# Patient Record
Sex: MALE | Race: WHITE | Employment: OTHER | URBAN - METROPOLITAN AREA
[De-identification: names, ages, dates, MRNs, and addresses within clinical notes are randomized per-mention and may not be internally consistent; named-entity substitution may affect disease eponyms.]

---

## 2021-04-21 ENCOUNTER — APPOINTMENT (OUTPATIENT)
Dept: CT IMAGING | Age: 81
End: 2021-04-21
Attending: EMERGENCY MEDICINE
Payer: MEDICARE

## 2021-04-21 ENCOUNTER — HOSPITAL ENCOUNTER (EMERGENCY)
Age: 81
Discharge: HOME | End: 2021-04-21
Attending: EMERGENCY MEDICINE
Payer: MEDICARE

## 2021-04-21 ENCOUNTER — APPOINTMENT (OUTPATIENT)
Dept: GENERAL RADIOLOGY | Age: 81
End: 2021-04-21
Attending: EMERGENCY MEDICINE
Payer: MEDICARE

## 2021-04-21 VITALS
SYSTOLIC BLOOD PRESSURE: 119 MMHG | RESPIRATION RATE: 16 BRPM | TEMPERATURE: 98.2 F | WEIGHT: 140 LBS | OXYGEN SATURATION: 98 % | DIASTOLIC BLOOD PRESSURE: 71 MMHG | HEIGHT: 71 IN | HEART RATE: 72 BPM | BODY MASS INDEX: 19.6 KG/M2

## 2021-04-21 DIAGNOSIS — S32.010A COMPRESSION FRACTURE OF L1 VERTEBRA, INITIAL ENCOUNTER (HCC): Primary | ICD-10-CM

## 2021-04-21 PROCEDURE — 74011000250 HC RX REV CODE- 250: Performed by: EMERGENCY MEDICINE

## 2021-04-21 PROCEDURE — 99284 EMERGENCY DEPT VISIT MOD MDM: CPT

## 2021-04-21 PROCEDURE — 74011250637 HC RX REV CODE- 250/637: Performed by: EMERGENCY MEDICINE

## 2021-04-21 PROCEDURE — 72131 CT LUMBAR SPINE W/O DYE: CPT

## 2021-04-21 PROCEDURE — 72128 CT CHEST SPINE W/O DYE: CPT

## 2021-04-21 PROCEDURE — 72170 X-RAY EXAM OF PELVIS: CPT

## 2021-04-21 RX ORDER — LIDOCAINE 4 G/100G
2 PATCH TOPICAL ONCE
Status: DISCONTINUED | OUTPATIENT
Start: 2021-04-21 | End: 2021-04-21 | Stop reason: HOSPADM

## 2021-04-21 RX ORDER — OXYCODONE HYDROCHLORIDE 5 MG/1
2.5 TABLET ORAL
Qty: 12 TAB | Refills: 0 | Status: SHIPPED | OUTPATIENT
Start: 2021-04-21 | End: 2021-04-27

## 2021-04-21 RX ORDER — ACETAMINOPHEN 325 MG/1
650 TABLET ORAL
Qty: 100 TAB | Refills: 0 | Status: SHIPPED | OUTPATIENT
Start: 2021-04-21

## 2021-04-21 RX ORDER — ACETAMINOPHEN 325 MG/1
650 TABLET ORAL
Status: COMPLETED | OUTPATIENT
Start: 2021-04-21 | End: 2021-04-21

## 2021-04-21 RX ORDER — OXYCODONE HYDROCHLORIDE 5 MG/1
5 TABLET ORAL
Status: COMPLETED | OUTPATIENT
Start: 2021-04-21 | End: 2021-04-21

## 2021-04-21 RX ADMIN — ACETAMINOPHEN 650 MG: 325 TABLET ORAL at 12:53

## 2021-04-21 RX ADMIN — OXYCODONE 5 MG: 5 TABLET ORAL at 12:53

## 2021-04-21 NOTE — ED PROVIDER NOTES
HPI   Chief Complaint   Patient presents with    Back Pain   39-year-old male with history of seizure presents with back pain. Patient reports 3 days ago he was carrying heavy loads when he lost balance and hit his back and right hip hit the wall. Patient denies falling on the ground or hitting his head, he denies any loss of consciousness. Patient reports since this injury he has had moderate intensity constant sharp mid back pain and right hip pain. Patient reports he is still able to walk without issues. Patient denies any numbness including groin numbness, he denies any urinary or fecal incontinence. Patient took Tylenol with partial relief. Past Medical History:   Diagnosis Date    Seizure [facility])        History reviewed. No pertinent surgical history. History reviewed. No pertinent family history.     Social History     Socioeconomic History    Marital status: SINGLE     Spouse name: Not on file    Number of children: Not on file    Years of education: Not on file    Highest education level: Not on file   Occupational History    Not on file   Social Needs    Financial resource strain: Not on file    Food insecurity     Worry: Not on file     Inability: Not on file    Transportation needs     Medical: Not on file     Non-medical: Not on file   Tobacco Use    Smoking status: Never Smoker   Substance and Sexual Activity    Alcohol use: Not on file    Drug use: Not on file    Sexual activity: Not on file   Lifestyle    Physical activity     Days per week: Not on file     Minutes per session: Not on file    Stress: Not on file   Relationships    Social connections     Talks on phone: Not on file     Gets together: Not on file     Attends Holiness service: Not on file     Active member of club or organization: Not on file     Attends meetings of clubs or organizations: Not on file     Relationship status: Not on file    Intimate partner violence     Fear of current or ex partner: Not on file     Emotionally abused: Not on file     Physically abused: Not on file     Forced sexual activity: Not on file   Other Topics Concern    Not on file   Social History Narrative    Not on file         ALLERGIES: Patient has no known allergies. Review of Systems   Musculoskeletal: Positive for back pain. Right hip pain   All other systems reviewed and are negative. Vitals:    04/21/21 0923 04/21/21 0950 04/21/21 1230   BP: (!) 179/81  119/71   Pulse: 74  72   Resp: 16  16   Temp: 98.2 °F (36.8 °C)     SpO2: 98% 98% 98%   Weight: 63.5 kg (140 lb)     Height: 5' 11\" (1.803 m)              Physical Exam   Patient Vitals for the past 8 hrs:   Temp Pulse Resp BP SpO2   04/21/21 1230  72 16 119/71 98 %   04/21/21 0950     98 %   04/21/21 0923 98.2 °F (36.8 °C) 74 16 (!) 179/81 98 %        Nursing note and vitals reviewed. Constitutional: NAD. Head: Normocephalic and atraumatic. Mouth/Throat: Airway patent. Moist mucous membranes. Eyes: EOMI. No Scleral icterus. Neck: Neck supple. Cardiovascular: Well perfused throughout. Pulmonary/Chest: No respiratory distress. Musculoskeletal: No gross deformities. There is thoracic midline tenderness as well as lumbar midline tenderness without step-off. There is right pelvic ring tenderness without deformity or pelvic instability. Normal gait. Bilateral feet warm and well perfused. Neurological: Alert and oriented to person, place, and time. Cranial Nerves 2-12 intact. Moving all extremities. No gross deficits  Psych: Pleasant, cooperative. Skin: Skin is warm and dry. No rash noted. MDM   Ddx = thoracic fracture, lumbar fracture, right pelvic fracture. Imaging showing L1 compression fracture. Interventional radiology Dr. Kamron Nice is consulted and saw the patient, recommend outpatient MRI and further consideration of kyphoplasty. Prescribed Tylenol, lidocaine patch and short course low-dose oxycodone.   Discharged with return precautions. Labs Reviewed - No data to display  CT SPINE [facility] WO CONT   Final Result      1. Compression fracture of L1 with approximately 15% loss of vertebral body   height. Patient may be candidate for kyphoplasty. 2. Osteopenia with mild degenerative changes in the thoracic and lumbar spine. 3. Nonobstructing renal calculi. Atherosclerotic disease. CT SPINE LUMB WO CONT   Final Result      1. Compression fracture of L1 with approximately 15% loss of vertebral body   height. Patient may be candidate for kyphoplasty. 2. Osteopenia with mild degenerative changes in the thoracic and lumbar spine. 3. Nonobstructing renal calculi. Atherosclerotic disease. XR PELV 1 OR 2 V   Final Result      IR VERTEBROPLASTY LUMBAR 1 BODY    (Results Pending)     Medications   lidocaine 4 % patch 2 Patch (2 Patches TransDERmal Apply Patch 4/21/21 1102)   acetaminophen (TYLENOL) tablet 650 mg (650 mg Oral Given 4/21/21 1253)   oxyCODONE IR (ROXICODONE) tablet 5 mg (5 mg Oral Given 4/21/21 1253)     IMorales MD, am  the first and primary ED provider for this patient.           Procedures

## 2021-04-21 NOTE — CONSULTS
Consult Date: 4/21/2021    IP CONSULT TO INTERVENTIONAL RADIOLOGY  Consult performed by: Celia Appiah DO  Consult ordered by: Corona Nichols MD  Reason for consult: L1 compression fracture  Assessment/Recommendations: 57-year-old male with ongoing back pain localizing to an acute appearing L1 compression fracture. CT of the lumbar spine demonstrates diffuse osteopenia, with approximately 15% L1 vertebral body height loss without retropulsion. Pelvic radiograph is negative for fracture. No radicular signs or symptoms. Given the mild to moderate severity of his back pain, at this point I recommend starting with outpatient oral pain medication regimen and discharge from the emergency department. We will set him up for an appointment to have a TLSO brace fitted and a lumbar spine MRI performed. If he continues to have significant back pain, and if the MRI shows him to be a candidate for kyphoplasty, we can perform kyphoplasty on an outpatient basis. We will also refer him to the osteoporosis clinic for bone mineralization evaluation/management. Thank you very much for involving interventional radiology in the care of this pleasant patient. Rachell Tolentino DO  Vascular and Interventional Radiology  [facility] Mr. Dani Sanchez is an 57-year-old male who presents to the emergency department with back and right hip pain which began after straining to lift something heavy 2 days ago and subsequently lost balance and hit his back and hip against a wall. He localizes pain near the mid back, sharp in nature,  4/10 VAS, worse with movement. His right hip pain is severe, localizing to the right gluteal soft tissues, 10/10 VAS. He denies any lower extremity pain, weakness, or paresthesias, no urinary or fecal incontinence. Lidocaine patches placed in the emergency department along the bilateral hips and Tylenol taken at home have had little to no effect so far.     Past Medical History:   Diagnosis Date    Seizure [facility])       History reviewed. No pertinent surgical history. History reviewed. No pertinent family history. Social History     Tobacco Use    Smoking status: Never Smoker   Substance Use Topics    Alcohol use: Not on file       Current Facility-Administered Medications   Medication Dose Route Frequency Provider Last Rate Last Admin    lidocaine 4 % patch 2 Patch  2 Patch TransDERmal ONCE Avril Mcdowell MD   2 Patch at 04/21/21 1102    acetaminophen (TYLENOL) tablet 650 mg  650 mg Oral NOW Avril Mcdowell MD        oxyCODONE IR (ROXICODONE) tablet 5 mg  5 mg Oral NOW Avril Mcdowell MD         Current Outpatient Medications   Medication Sig Dispense Refill    acetaminophen (TYLENOL) 325 mg tablet Take 2 Tabs by mouth every six (6) hours as needed for Pain. 100 Tab 0    oxyCODONE IR (Roxicodone) 5 mg immediate release tablet Take 0.5 Tabs by mouth every six (6) hours as needed for Pain for up to 6 days. Max Daily Amount: 10 mg. 12 Tab 0    lidocaine 1.8 % ptmd 2 Patches by Apply Externally route daily. 20 Patch 0        Review of Systems   Constitutional: Negative. HENT: Negative. Respiratory: Negative. Cardiovascular: Negative. Gastrointestinal: Negative. Genitourinary: Negative. Musculoskeletal:        See HPI   Skin: Negative. Neurological: Negative. Psychiatric/Behavioral: Negative. Objective     Vital signs for last 24 hours:  Visit Vitals  BP (!) 179/81   Pulse 74   Temp 98.2 °F (36.8 °C)   Resp 16   Ht 5' 11\" (1.803 m)   Wt 63.5 kg (140 lb)   SpO2 98%   BMI 19.53 kg/m²       Intake/Output this shift:  Current Shift: No intake/output data recorded. Last 3 Shifts: No intake/output data recorded. Data Review:   No results found for this or any previous visit (from the past 24 hour(s)). Physical Exam  Constitutional:       Appearance: Normal appearance. He is normal weight. HENT:      Head: Normocephalic and atraumatic. Nose: Nose normal.   Eyes:      Extraocular Movements: Extraocular movements intact. Neck:      Musculoskeletal: Neck supple. Cardiovascular:      Rate and Rhythm: Normal rate and regular rhythm. Pulmonary:      Effort: Pulmonary effort is normal.   Abdominal:      General: Abdomen is flat. Palpations: Abdomen is soft. Musculoskeletal:      Comments: Tenderness to palpation along the midline of the spine at the thoracolumbar junction. Tenderness to palpation along the superior right gluteal soft tissues, just below the iliac crest.  5/5 strength in both lower extremities   Skin:     General: Skin is warm and dry. Neurological:      General: No focal deficit present. Mental Status: He is alert and oriented to person, place, and time. Sensory: No sensory deficit. Motor: No weakness. Psychiatric:         Mood and Affect: Mood normal.         Behavior: Behavior normal.         Thought Content:  Thought content normal.         Judgment: Judgment normal.

## 2021-04-21 NOTE — ED NOTES
Discharge instructions reviewed with pt to include follow up for MRI . Pt verbalizes an understanding.  Awaiting transportation home

## 2021-04-22 ENCOUNTER — TRANSCRIBE ORDER (OUTPATIENT)
Dept: SCHEDULING | Age: 81
End: 2021-04-22

## 2021-04-22 DIAGNOSIS — S32.010A COMPRESSION FRACTURE OF L1 LUMBAR VERTEBRA (HCC): Primary | ICD-10-CM

## 2021-04-23 ENCOUNTER — HOSPITAL ENCOUNTER (OUTPATIENT)
Dept: MRI IMAGING | Age: 81
Discharge: HOME | End: 2021-04-23
Attending: RADIOLOGY
Payer: MEDICARE

## 2021-04-23 DIAGNOSIS — S32.010A COMPRESSION FRACTURE OF L1 LUMBAR VERTEBRA (HCC): ICD-10-CM

## 2021-04-23 PROCEDURE — 72148 MRI LUMBAR SPINE W/O DYE: CPT

## 2021-04-27 ENCOUNTER — HOSPITAL ENCOUNTER (OUTPATIENT)
Dept: INTERVENTIONAL RADIOLOGY/VASCULAR | Age: 81
Discharge: HOME | End: 2021-04-27
Attending: RADIOLOGY
Payer: MEDICARE

## 2021-04-27 VITALS
RESPIRATION RATE: 16 BRPM | SYSTOLIC BLOOD PRESSURE: 138 MMHG | OXYGEN SATURATION: 99 % | TEMPERATURE: 97.7 F | BODY MASS INDEX: 19.88 KG/M2 | WEIGHT: 142 LBS | HEIGHT: 71 IN | HEART RATE: 80 BPM | DIASTOLIC BLOOD PRESSURE: 76 MMHG

## 2021-04-27 DIAGNOSIS — S32.010A COMPRESSION FRACTURE OF L1 VERTEBRA (HCC): ICD-10-CM

## 2021-04-27 LAB
ANION GAP SERPL CALC-SCNC: 6 MMOL/L (ref 5–15)
BUN SERPL-MCNC: 43 MG/DL (ref 6–20)
BUN/CREAT SERPL: 38 (ref 12–20)
CA-I BLD-MCNC: 9.6 MG/DL (ref 8.5–10.1)
CHLORIDE SERPL-SCNC: 106 MMOL/L (ref 97–108)
CO2 SERPL-SCNC: 28 MMOL/L (ref 21–32)
CREAT SERPL-MCNC: 1.13 MG/DL (ref 0.7–1.3)
ERYTHROCYTE [DISTWIDTH] IN BLOOD BY AUTOMATED COUNT: 13.8 % (ref 11.5–14.5)
GLUCOSE SERPL-MCNC: 113 MG/DL (ref 65–100)
HCT VFR BLD AUTO: 43.7 % (ref 36.6–50.3)
HGB BLD-MCNC: 14.3 G/DL (ref 12.1–17)
INR PPP: 1.1 (ref 0.9–1.1)
MCH RBC QN AUTO: 31.2 PG (ref 26–34)
MCHC RBC AUTO-ENTMCNC: 32.7 G/DL (ref 30–36.5)
MCV RBC AUTO: 95.2 FL (ref 80–99)
PLATELET # BLD AUTO: 239 K/UL (ref 150–400)
PMV BLD AUTO: 10 FL (ref 8.9–12.9)
POTASSIUM SERPL-SCNC: 4 MMOL/L (ref 3.5–5.1)
PROTHROMBIN TIME: 14 SEC (ref 11.9–14.7)
RBC # BLD AUTO: 4.59 M/UL (ref 4.1–5.7)
SODIUM SERPL-SCNC: 140 MMOL/L (ref 136–145)
WBC # BLD AUTO: 8.5 K/UL (ref 4.1–11.1)

## 2021-04-27 PROCEDURE — 99152 MOD SED SAME PHYS/QHP 5/>YRS: CPT

## 2021-04-27 PROCEDURE — 22514 PERQ VERTEBRAL AUGMENTATION: CPT

## 2021-04-27 PROCEDURE — 99153 MOD SED SAME PHYS/QHP EA: CPT

## 2021-04-27 PROCEDURE — 85610 PROTHROMBIN TIME: CPT

## 2021-04-27 PROCEDURE — 88311 DECALCIFY TISSUE: CPT

## 2021-04-27 PROCEDURE — 36415 COLL VENOUS BLD VENIPUNCTURE: CPT

## 2021-04-27 PROCEDURE — 77030041313 HC TY KYPH FRST FX MEDT -K1

## 2021-04-27 PROCEDURE — 77030003451 HC NDL BIOP BN MEDT -C

## 2021-04-27 PROCEDURE — 80048 BASIC METABOLIC PNL TOTAL CA: CPT

## 2021-04-27 PROCEDURE — 74011250636 HC RX REV CODE- 250/636: Performed by: RADIOLOGY

## 2021-04-27 PROCEDURE — 85027 COMPLETE CBC AUTOMATED: CPT

## 2021-04-27 PROCEDURE — 88307 TISSUE EXAM BY PATHOLOGIST: CPT

## 2021-04-27 PROCEDURE — 77030021783 HC SYS CEM DEL MEDT -D

## 2021-04-27 PROCEDURE — C1713 ANCHOR/SCREW BN/BN,TIS/BN: HCPCS

## 2021-04-27 RX ORDER — CEFAZOLIN SODIUM 1 G/3ML
2 INJECTION, POWDER, FOR SOLUTION INTRAMUSCULAR; INTRAVENOUS
Status: COMPLETED | OUTPATIENT
Start: 2021-04-27 | End: 2021-04-27

## 2021-04-27 RX ORDER — LIDOCAINE HYDROCHLORIDE 10 MG/ML
100 INJECTION INFILTRATION; PERINEURAL ONCE
Status: ACTIVE | OUTPATIENT
Start: 2021-04-27 | End: 2021-04-27

## 2021-04-27 RX ORDER — FENTANYL CITRATE 50 UG/ML
12.5-1 INJECTION, SOLUTION INTRAMUSCULAR; INTRAVENOUS
Status: DISCONTINUED | OUTPATIENT
Start: 2021-04-28 | End: 2021-05-06 | Stop reason: HOSPADM

## 2021-04-27 RX ORDER — MIDAZOLAM HYDROCHLORIDE 1 MG/ML
.5-2 INJECTION, SOLUTION INTRAMUSCULAR; INTRAVENOUS
Status: DISCONTINUED | OUTPATIENT
Start: 2021-04-28 | End: 2021-05-06 | Stop reason: HOSPADM

## 2021-04-27 RX ORDER — KETOROLAC TROMETHAMINE 30 MG/ML
15 INJECTION, SOLUTION INTRAMUSCULAR; INTRAVENOUS
Status: COMPLETED | OUTPATIENT
Start: 2021-04-27 | End: 2021-04-27

## 2021-04-27 RX ORDER — NAPROXEN SODIUM 500 MG/1
500 TABLET, FILM COATED, EXTENDED RELEASE ORAL 2 TIMES DAILY
Qty: 28 TAB | Refills: 0 | Status: SHIPPED | OUTPATIENT
Start: 2021-04-27 | End: 2021-05-11

## 2021-04-27 RX ORDER — DIVALPROEX SODIUM 125 MG/1
300 TABLET, DELAYED RELEASE ORAL 2 TIMES DAILY
COMMUNITY

## 2021-04-27 RX ORDER — BUPIVACAINE HYDROCHLORIDE 2.5 MG/ML
10 INJECTION, SOLUTION EPIDURAL; INFILTRATION; INTRACAUDAL ONCE
Status: ACTIVE | OUTPATIENT
Start: 2021-04-27 | End: 2021-04-27

## 2021-04-27 RX ADMIN — FENTANYL CITRATE 50 MCG: 50 INJECTION, SOLUTION INTRAMUSCULAR; INTRAVENOUS at 10:06

## 2021-04-27 RX ADMIN — KETOROLAC TROMETHAMINE 15 MG: 30 INJECTION, SOLUTION INTRAMUSCULAR at 09:58

## 2021-04-27 RX ADMIN — MIDAZOLAM HYDROCHLORIDE 1 MG: 2 INJECTION, SOLUTION INTRAMUSCULAR; INTRAVENOUS at 10:06

## 2021-04-27 RX ADMIN — CEFAZOLIN SODIUM 2 G: 1 INJECTION, POWDER, FOR SOLUTION INTRAMUSCULAR; INTRAVENOUS at 09:58

## 2021-04-27 NOTE — DISCHARGE INSTRUCTIONS
Patient Education        Kyphoplasty: What to Expect at Home  Your Recovery  After kyphoplasty to relieve pain from compression fractures, your back may feel sore where the hollow needle (trocar) went into your back. This should go away in a few days. Most people are able to return to their daily activities within a day. This care sheet gives you a general idea about how long it will take for you to recover. But each person recovers at a different pace. Follow the steps below to get better as quickly as possible. How can you care for yourself at home? Activity    · Take it easy for the first 24 hours. Rest when you feel tired. Getting enough sleep will help you recover.     · For the first day after the procedure, avoid lifting anything that would make you strain. This may include heavy grocery bags and milk containers, a heavy briefcase or backpack, cat litter or dog food bags, a vacuum , or a child. Diet    · You can eat your normal diet. If your stomach is upset, try bland, low-fat foods like plain rice, broiled chicken, toast, and yogurt. Medicines    · Your doctor will tell you if and when you can restart your medicines. He or she will also give you instructions about taking any new medicines.     · If you take aspirin or some other blood thinner, ask your doctor if and when to start taking it again. Make sure that you understand exactly what your doctor wants you to do.     · Be safe with medicines. Take pain medicines exactly as directed. ? If the doctor gave you a prescription medicine for pain, take it as prescribed. ? If you are not taking a prescription pain medicine, ask your doctor if you can take an over-the-counter medicine. ? Do not take two or more pain medicines at the same time unless your doctor told you to. Many pain medicines have acetaminophen, which is Tylenol. Too much acetaminophen (Tylenol) can be harmful.    Incision care    · You will have a dressing over the cut (incision). A dressing helps the incision heal and protects it. Your doctor will tell you how to take care of this. Ice    · If you are sore where the needle was inserted, put ice or a cold pack on your back for 10 to 20 minutes at a time. Try to do this every 1 to 2 hours for the next 3 days (when you are awake) or until the swelling goes down. Put a thin cloth between the ice and your skin. Follow-up care is a key part of your treatment and safety. Be sure to make and go to all appointments, and call your doctor if you are having problems. It's also a good idea to know your test results and keep a list of the medicines you take. When should you call for help? Call 911 anytime you think you may need emergency care. For example, call if:    · You passed out (lost consciousness).     · You have severe trouble breathing.     · You have sudden chest pain and shortness of breath, or you cough up blood.     · You are unable to move a leg at all. Call your doctor now or seek immediate medical care if:    · You have new or worse symptoms in your legs, belly, or buttocks. Symptoms may include:  ? Numbness or tingling. ? Weakness. ? Pain.     · You lose bladder or bowel control.     · You have signs of infection, such as:  ? Increased pain, swelling, warmth, or redness. ? Red streaks leading from the incision. ? Pus draining from the incision. ? Swollen lymph nodes in your neck, armpits, or groin. ? A fever. Watch closely for any changes in your health, and be sure to contact your doctor if:    · You do not get better as expected. Where can you learn more? Go to http://www.gray.com/  Enter O461 in the search box to learn more about \"Kyphoplasty: What to Expect at Home. \"  Current as of: November 16, 2020               Content Version: 12.8  © 3282-7517 [employer], [employer].    Care instructions adapted under license by [employer] (which disclaims liability or warranty for this information). If you have questions about a medical condition or this instruction, always ask your healthcare professional. [place] [address] any warranty or liability for your use of this information.

## 2021-04-27 NOTE — PROGRESS NOTES
VSS, dressing clean, dry and intact, patient up to restroom. IV removed, tip intact, no complications, gauze and tape applied to site. Discharge instructions provided to patient, no questions or concerns at this time. Patient discharged home with friend via wheelchair.

## 2021-04-27 NOTE — CONSULTS
Patient follow-up apt scheduled with Dr. Eileen Childress office for [address] at [phone].  Left voicemail with pt for appointment

## 2021-07-05 ENCOUNTER — HOSPITAL ENCOUNTER (EMERGENCY)
Age: 81
Discharge: HOME | End: 2021-07-05
Payer: MEDICARE

## 2021-07-05 VITALS
WEIGHT: 132 LBS | HEIGHT: 71 IN | RESPIRATION RATE: 16 BRPM | OXYGEN SATURATION: 99 % | BODY MASS INDEX: 18.48 KG/M2 | DIASTOLIC BLOOD PRESSURE: 89 MMHG | HEART RATE: 87 BPM | SYSTOLIC BLOOD PRESSURE: 148 MMHG | TEMPERATURE: 98.4 F

## 2021-07-05 DIAGNOSIS — T36.95XA ALLERGIC REACTION DUE TO ANTIBACTERIAL DRUG: Primary | ICD-10-CM

## 2021-07-05 DIAGNOSIS — R21 RASH: ICD-10-CM

## 2021-07-05 PROCEDURE — 74011636637 HC RX REV CODE- 636/637: Performed by: NURSE PRACTITIONER

## 2021-07-05 PROCEDURE — 99283 EMERGENCY DEPT VISIT LOW MDM: CPT

## 2021-07-05 PROCEDURE — A9270 NON-COVERED ITEM OR SERVICE: HCPCS | Performed by: NURSE PRACTITIONER

## 2021-07-05 RX ORDER — METHYLPREDNISOLONE 4 MG/1
TABLET ORAL
Qty: 1 DOSE PACK | Refills: 0 | Status: SHIPPED | OUTPATIENT
Start: 2021-07-05

## 2021-07-05 RX ORDER — PREDNISONE 20 MG/1
60 TABLET ORAL
Status: COMPLETED | OUTPATIENT
Start: 2021-07-05 | End: 2021-07-05

## 2021-07-05 RX ADMIN — PREDNISONE 60 MG: 20 TABLET ORAL at 19:10

## 2021-07-05 NOTE — ED TRIAGE NOTES
GCS 15 pt stated that he has a real bad rash after he astarted taking a new ABT cephalexin; pt is taking ABT for a spider bite that happened on Friday

## 2021-07-05 NOTE — ED PROVIDER NOTES
EMERGENCY DEPARTMENT HISTORY AND PHYSICAL EXAM      Date: 7/5/2021  Patient Name: Kaylin Delgado    History of Presenting Illness     Chief Complaint   Patient presents with    Rash       History Provided By: Patient    HPI: Kaylin Delgado, 78 y.o. male with a past medical history significant seizure, compression fracture presents to the ED with cc of diffuse red rash to bilateral lower extremities, trunk, bilateral upper arms. Patient reports symptoms presented starting Keflex Monday 500 mg 4 times a day due to possible spider bite 8 days ago. He reports taking his last dose this morning however stopped due to increased red rash noted all over his body. He reports initially the rash started small at the site of insect bite however started diffusely spreading. He denies any shortness of breath, difficulty swallowing, change in voice, drooling chest pain, nausea, vomiting, itching, warmth, drainage, fever, chills. There are no other complaints, changes, or physical findings at this time. PCP: Lisa Cevallos MD    No current facility-administered medications on file prior to encounter. Current Outpatient Medications on File Prior to Encounter   Medication Sig Dispense Refill    divalproex DR (Depakote) 125 mg tablet Take 300 mg by mouth two (2) times a day.  acetaminophen (TYLENOL) 325 mg tablet Take 2 Tabs by mouth every six (6) hours as needed for Pain. 100 Tab 0    lidocaine 1.8 % ptmd 2 Patches by Apply Externally route daily. 20 Patch 0       Past History     Past Medical History:  Past Medical History:   Diagnosis Date    Seizure ([address]     Seizures ([address]        Past Surgical History:  Past Surgical History:   Procedure Laterality Date    IR KYPHOPLASTY LUMBAR  4/27/2021       Family History:  No family history on file.     Social History:  Social History     Tobacco Use    Smoking status: Never Smoker    Smokeless tobacco: Never Used   Substance Use Topics    Alcohol use: Never    Drug use: Never       Allergies: Allergies   Allergen Reactions    Cephalexin Hives    Dilantin [Phenytoin Sodium Extended] Hives         Review of Systems     Review of Systems   Constitutional: Negative for chills and fever. HENT: Negative for congestion, drooling, sinus pressure, sinus pain, trouble swallowing and voice change. Respiratory: Negative for cough and shortness of breath. Cardiovascular: Negative for chest pain and leg swelling. Gastrointestinal: Negative for abdominal pain, nausea and vomiting. Genitourinary: Negative for dysuria, frequency and urgency. Musculoskeletal: Negative for arthralgias and myalgias. Skin: Positive for rash. Neurological: Negative for dizziness, weakness, light-headedness, numbness and headaches. Psychiatric/Behavioral: Negative. Physical Exam     Physical Exam  Vitals and nursing note reviewed. Constitutional:       General: He is not in acute distress. Appearance: Normal appearance. He is normal weight. He is not ill-appearing or toxic-appearing. HENT:      Head: Normocephalic and atraumatic. Right Ear: Hearing normal.      Left Ear: Hearing normal.      Nose: Nose normal.      Mouth/Throat:      Mouth: Mucous membranes are moist.   Eyes:      General: Lids are normal.      Extraocular Movements: Extraocular movements intact. Pupils: Pupils are equal, round, and reactive to light. Cardiovascular:      Rate and Rhythm: Normal rate and regular rhythm. Pulses: Normal pulses. Radial pulses are 2+ on the right side and 2+ on the left side. Dorsalis pedis pulses are 2+ on the right side and 2+ on the left side. Pulmonary:      Effort: Pulmonary effort is normal. No accessory muscle usage or respiratory distress. Breath sounds: Normal breath sounds. No wheezing or rhonchi. Abdominal:      General: Bowel sounds are normal.      Palpations: Abdomen is soft. Tenderness:  There is no abdominal tenderness. There is no right CVA tenderness or left CVA tenderness. Musculoskeletal:      Cervical back: Normal range of motion and neck supple. No muscular tenderness. Right lower leg: No edema. Left lower leg: No edema. Feet:      Right foot:      Skin integrity: No skin breakdown. Left foot:      Skin integrity: No skin breakdown. Skin:     General: Skin is warm and dry. Capillary Refill: Capillary refill takes less than 2 seconds. Findings: Erythema present. No abrasion, bruising, ecchymosis or signs of injury. Rash is not crusting, purpuric, scaling or vesicular. Comments: See pic below   Neurological:      Mental Status: He is alert and oriented to person, place, and time. GCS: GCS eye subscore is 4. GCS verbal subscore is 5. GCS motor subscore is 6. Cranial Nerves: Cranial nerves are intact. Sensory: Sensation is intact. Psychiatric:         Attention and Perception: Attention normal.         Mood and Affect: Mood normal.         Behavior: Behavior normal. Behavior is cooperative. Cognition and Memory: Cognition normal.                 Lab and Diagnostic Study Results     Labs -   No results found for this or any previous visit (from the past 12 hour(s)). Radiologic Studies -   @lastxrresult@  CT Results  (Last 48 hours)    None        CXR Results  (Last 48 hours)    None            Medical Decision Making   - I am the first provider for this patient. - I reviewed the vital signs, available nursing notes, past medical history, past surgical history, family history and social history. - Initial assessment performed. The patients presenting problems have been discussed, and they are in agreement with the care plan formulated and outlined with them. I have encouraged them to ask questions as they arise throughout their visit. Vital Signs-Reviewed the patient's vital signs.   Patient Vitals for the past 12 hrs:   Temp Pulse Resp BP SpO2   07/05/21 1748 98.4 °F (36.9 °C) 87 16 (!) 148/89 99 %         The patient presents with rash with a differential diagnosis of allergic reaction to medication, hives, atopic dermatitis, infection      ED Course:          Provider Notes (Medical Decision Making):     Patient advised to discontinue use of Keflex, start medrol dose pack, no warmth, drainage, edema noted to site. Low concern for cellulitis. Patient is afebrile not tachycardic SPO2 99% room air no acute respiratory distress lungs clear to auscultation no rales rhonchi wheezing. Patient advised to follow-up with PCP in the next 2 days for improvement in rash. Verbalized understanding. Stable at time of discharge. Procedures   Medical Decision Makingedical Decision Making  Performed by: Pebbles Barlow NP  PROCEDURES:  Procedures       Disposition   Disposition: DC- Adult Discharges: All of the diagnostic tests were reviewed and questions answered. Diagnosis, care plan and treatment options were discussed. The patient understands the instructions and will follow up as directed. The patients results have been reviewed with them. They have been counseled regarding their diagnosis. The patient verbally convey understanding and agreement of the signs, symptoms, diagnosis, treatment and prognosis and additionally agrees to follow up as recommended with their PCP in 24 - 48 hours. They also agree with the care-plan and convey that all of their questions have been answered. I have also put together some discharge instructions for them that include: 1) educational information regarding their diagnosis, 2) how to care for their diagnosis at home, as well a 3) list of reasons why they would want to return to the ED prior to their follow-up appointment, should their condition change. Discharged    DISCHARGE PLAN:  1.    Current Discharge Medication List      START taking these medications    Details   methylPREDNISolone (Medrol, Dylon,) 4 mg tablet Take as advised on pack  Qty: 1 Dose Pack, Refills: 0         CONTINUE these medications which have NOT CHANGED    Details   divalproex DR (Depakote) 125 mg tablet Take 300 mg by mouth two (2) times a day. acetaminophen (TYLENOL) 325 mg tablet Take 2 Tabs by mouth every six (6) hours as needed for Pain. Qty: 100 Tab, Refills: 0      lidocaine 1.8 % ptmd 2 Patches by Apply Externally route daily. Qty: 20 Patch, Refills: 0           2. Follow-up Information     Follow up With Specialties Details Why Contact Info    Enid Ruiz MD Family Medicine Schedule an appointment as soon as possible for a visit in 2 days  [address] [facility]  [phone]          3. Return to ED if worse   4. Current Discharge Medication List      START taking these medications    Details   methylPREDNISolone (Medrol, Dylon,) 4 mg tablet Take as advised on pack  Qty: 1 Dose Pack, Refills: 0  Start date: 7/5/2021               Diagnosis     Clinical Impression:   1. Allergic reaction due to antibacterial drug    2. Rash        Attestations:    Mat Sutherland NP    Please note that this dictation was completed with [employer]le, the [employer] voice recognition software. Quite often unanticipated grammatical, syntax, homophones, and other interpretive errors are inadvertently transcribed by the computer software. Please disregard these errors. Please excuse any errors that have escaped final proofreading. Thank you.

## 2023-05-25 RX ORDER — ACETAMINOPHEN 325 MG/1
650 TABLET ORAL EVERY 6 HOURS PRN
COMMUNITY
Start: 2021-04-21

## 2023-05-25 RX ORDER — DIVALPROEX SODIUM 125 MG/1
300 TABLET, DELAYED RELEASE ORAL 2 TIMES DAILY
COMMUNITY

## 2023-05-25 RX ORDER — METHYLPREDNISOLONE 4 MG/1
TABLET ORAL
COMMUNITY
Start: 2021-07-05

## 2024-05-07 ENCOUNTER — APPOINTMENT (OUTPATIENT)
Facility: HOSPITAL | Age: 84
End: 2024-05-07
Payer: MEDICARE

## 2024-05-07 ENCOUNTER — HOSPITAL ENCOUNTER (EMERGENCY)
Facility: HOSPITAL | Age: 84
Discharge: HOME | End: 2024-05-07
Attending: STUDENT IN AN ORGANIZED HEALTH CARE EDUCATION/TRAINING PROGRAM
Payer: MEDICARE

## 2024-05-07 VITALS
HEART RATE: 79 BPM | BODY MASS INDEX: 17.92 KG/M2 | OXYGEN SATURATION: 99 % | WEIGHT: 128 LBS | RESPIRATION RATE: 18 BRPM | TEMPERATURE: 97.7 F | DIASTOLIC BLOOD PRESSURE: 81 MMHG | HEIGHT: 71 IN | SYSTOLIC BLOOD PRESSURE: 129 MMHG

## 2024-05-07 DIAGNOSIS — S39.012A STRAIN OF LUMBAR REGION, INITIAL ENCOUNTER: Primary | ICD-10-CM

## 2024-05-07 PROCEDURE — 99283 EMERGENCY DEPT VISIT LOW MDM: CPT

## 2024-05-07 PROCEDURE — 72100 X-RAY EXAM L-S SPINE 2/3 VWS: CPT

## 2024-05-07 PROCEDURE — 6370000000 HC RX 637 (ALT 250 FOR IP): Performed by: STUDENT IN AN ORGANIZED HEALTH CARE EDUCATION/TRAINING PROGRAM

## 2024-05-07 RX ORDER — ACETAMINOPHEN 500 MG
500 TABLET ORAL 4 TIMES DAILY PRN
Qty: 30 TABLET | Refills: 1 | Status: SHIPPED | OUTPATIENT
Start: 2024-05-07

## 2024-05-07 RX ORDER — LIDOCAINE 4 G/G
1 PATCH TOPICAL DAILY
Qty: 30 PATCH | Refills: 0 | Status: SHIPPED | OUTPATIENT
Start: 2024-05-07 | End: 2024-06-06

## 2024-05-07 RX ORDER — ACETAMINOPHEN 325 MG/1
650 TABLET ORAL
Status: COMPLETED | OUTPATIENT
Start: 2024-05-07 | End: 2024-05-07

## 2024-05-07 RX ADMIN — ACETAMINOPHEN 650 MG: 325 TABLET ORAL at 21:29

## 2024-05-07 ASSESSMENT — PAIN DESCRIPTION - LOCATION
LOCATION: BACK
LOCATION: BACK

## 2024-05-07 ASSESSMENT — PAIN - FUNCTIONAL ASSESSMENT: PAIN_FUNCTIONAL_ASSESSMENT: 0-10

## 2024-05-07 ASSESSMENT — LIFESTYLE VARIABLES
HOW MANY STANDARD DRINKS CONTAINING ALCOHOL DO YOU HAVE ON A TYPICAL DAY: PATIENT DOES NOT DRINK
HOW OFTEN DO YOU HAVE A DRINK CONTAINING ALCOHOL: NEVER

## 2024-05-07 ASSESSMENT — PAIN SCALES - GENERAL
PAINLEVEL_OUTOF10: 9
PAINLEVEL_OUTOF10: 3
PAINLEVEL_OUTOF10: 6

## 2024-05-08 NOTE — ED TRIAGE NOTES
Patient presents with lower back pain, difficulty ambulating. Endorses leg pains as well along with lower abdominal pain.     History of slipped/fx disc per patient. Denies any new injury

## 2024-05-08 NOTE — ED PROVIDER NOTES
[facility] EMERGENCY DEPT  EMERGENCY DEPARTMENT HISTORY AND PHYSICAL EXAM      Date: 5/7/2024  Patient Name: Jesús Robles  MRN: [id number]  YOB: 1940  Date of evaluation: 5/7/2024  Provider: Maninder Olivarez MD   Note Started: 9:54 PM EDT 5/7/24    HISTORY OF PRESENT ILLNESS     Chief Complaint   Patient presents with    Back Pain    Abdominal Pain    Leg Pain       History Provided By: Patient    HPI: Jesús Robles is a 83 y.o. male presents to the emergency department for evaluation of lower back pain.  Patient has a history of compression fracture, states that approximate 5 days ago he noticed worsening pain to his lower back intermittent episodes of weakness to his left lower extremity.  Patient denies any bowel or bladder incontinence denies any numbness tingling to feet.  Currently states he is not having any weakness.  Additionally patient concerned about his left inguinal hernia states he has noticed bulging when he sits up, denies any pain.  Denies any nausea or vomiting, is having normal bowel movements    PAST MEDICAL HISTORY   Past Medical History:  Past Medical History:   Diagnosis Date    Seizure (HCC)     Seizures (HCC)        Past Surgical History:  Past Surgical History:   Procedure Laterality Date    IR KYPHOPLASTY LUMBAR FIRST LEVEL  4/27/2021    IR KYPHOPLASTY LUMBAR FIRST LEVEL 4/27/2021 [facility] RAD ANGIO IR    IR KYPHOPLASTY LUMBAR FIRST LEVEL  4/27/2021       Family History:  No family history on file.    Social History:  Social History     Tobacco Use    Smoking status: Never    Smokeless tobacco: Never   Substance Use Topics    Alcohol use: Never    Drug use: Never       Allergies:  Allergies   Allergen Reactions    Amoxicillin Anaphylaxis    Cephalexin Hives    Phenytoin Sodium Extended Hives       PCP: Theodore Kirkland MD    Current Meds:   No current facility-administered medications for this encounter.     Current Outpatient Medications   Medication Sig Dispense Refill

## 2024-09-17 ENCOUNTER — OFFICE VISIT (OUTPATIENT)
Age: 84
End: 2024-09-17
Payer: MEDICARE

## 2024-09-17 VITALS
DIASTOLIC BLOOD PRESSURE: 85 MMHG | OXYGEN SATURATION: 94 % | HEIGHT: 71 IN | RESPIRATION RATE: 16 BRPM | TEMPERATURE: 98.6 F | BODY MASS INDEX: 16.08 KG/M2 | WEIGHT: 114.86 LBS | HEART RATE: 88 BPM | SYSTOLIC BLOOD PRESSURE: 154 MMHG

## 2024-09-17 DIAGNOSIS — M80.00XS AGE-RELATED OSTEOPOROSIS WITH CURRENT PATHOLOGICAL FRACTURE, SEQUELA: ICD-10-CM

## 2024-09-17 DIAGNOSIS — M54.50 CHRONIC RIGHT-SIDED LOW BACK PAIN WITHOUT SCIATICA: Primary | ICD-10-CM

## 2024-09-17 DIAGNOSIS — G89.29 CHRONIC RIGHT-SIDED LOW BACK PAIN WITHOUT SCIATICA: Primary | ICD-10-CM

## 2024-09-17 DIAGNOSIS — M51.36 DEGENERATIVE DISC DISEASE, LUMBAR: ICD-10-CM

## 2024-09-17 DIAGNOSIS — S32.010S CLOSED COMPRESSION FRACTURE OF L1 VERTEBRA, SEQUELA: ICD-10-CM

## 2024-09-17 PROCEDURE — 1123F ACP DISCUSS/DSCN MKR DOCD: CPT | Performed by: ORTHOPAEDIC SURGERY

## 2024-09-17 PROCEDURE — 99204 OFFICE O/P NEW MOD 45 MIN: CPT | Performed by: ORTHOPAEDIC SURGERY

## 2024-09-17 RX ORDER — DIVALPROEX SODIUM 250 MG/1
250 TABLET, DELAYED RELEASE ORAL 2 TIMES DAILY
COMMUNITY
Start: 2024-08-27

## 2024-09-17 RX ORDER — AMLODIPINE BESYLATE 5 MG/1
5 TABLET ORAL DAILY
COMMUNITY
Start: 2024-08-27

## 2025-07-08 ENCOUNTER — HOSPITAL ENCOUNTER (OUTPATIENT)
Facility: HOSPITAL | Age: 85
Discharge: HOME | End: 2025-07-11
Payer: MEDICARE

## 2025-07-08 DIAGNOSIS — R05.9 COUGH, UNSPECIFIED TYPE: ICD-10-CM

## 2025-07-08 PROCEDURE — 71046 X-RAY EXAM CHEST 2 VIEWS: CPT
